# Patient Record
(demographics unavailable — no encounter records)

---

## 2024-12-06 NOTE — ADDENDUM
[FreeTextEntry1] :  Documented by Kenney Mahan acting as scribe for Dr. Marks on 12/06/2024. All Medical record entries made by the Scribe were at my, Dr. Marks, direction and personally dictated by me on 12/06/2024 . I have reviewed the chart and agree that the record accurately reflects my personal performance of the history, physical exam, assessment and plan. I have also personally directed, reviewed, and agreed with the discharge instructions.

## 2024-12-06 NOTE — HISTORY OF PRESENT ILLNESS
[de-identified] : Patient with h/o chronic rhinitis, pacemaker, right epistaxis, intranasal synechiae, chronic bilateral OE, and s/p right nasal cautery on 4/28/2020, left nasal cautery 8/22/22 presents today for a follow up. He states that he had a mild left-sided nosebleed on Sunday. He states that he is taking blood thinners. He states that he uses saline gel spray.

## 2024-12-06 NOTE — ASSESSMENT
[FreeTextEntry1] :  Reviewed and reconciled medications, allergies, PMHx, PSHx, SocHx, FMHx.  Patient with h/o chronic rhinitis, pacemaker, right epistaxis, intranasal synechiae, chronic bilateral OE, and s/p right nasal cautery on 4/28/2020, left nasal cautery 8/22/22 presents today for a follow up. He states that he had a mild left-sided nosebleed on Sunday. He states that he is taking blood thinners. He states that he uses saline gel spray.   Physical exam: -right ear canal: cerumen removed via curettage -left ear canal: cerumen removed via curettage -no lateralization to tuning forks -bilateral crusting, dryness, mucus, and vascularity intranasally -bifid uvula  ENT Procedure: Left Nasal Cauterization with Silver Nitrate  Plan: -Continue saline gel spray 3-4x per day starting tonight -Don't blow the nose or smear, only dab and throw tissue away. Sneeze with the mouth open -FU in 6 months

## 2024-12-06 NOTE — PHYSICAL EXAM
[Hearing Soliz Test (Tuning Fork On Forehead)] : no lateralization of tone [Normal] : mucosa is normal [Midline] : trachea located in midline position [Hearing Loss Right Only] : normal [Hearing Loss Left Only] : normal [FreeTextEntry8] :  cerumen removed via curettage [FreeTextEntry9] :  cerumen removed via curettage [de-identified] : bilateral crusting, dryness, mucus, and vascularity intranasally [de-identified] : bifid uvula

## 2024-12-06 NOTE — PROCEDURE
[FreeTextEntry1] : Left Nasal Cauterization with Silver Nitrate [FreeTextEntry2] : epistaxis [FreeTextEntry3] :  Procedure: Left Nasal Cauterization with Silver Nitrate. After topical 4% xylocaine and oxymetazoline, the nasal vault was re-evaluated. Bleeding site identified. Suction used to remove blood from the left nasal cavity. Cauterized with silver nitrate. Post-procedure instructions given. Patient tolerated procedure well

## 2024-12-06 NOTE — CONSULT LETTER
[Dear  ___] : Dear  [unfilled], [Courtesy Letter:] : I had the pleasure of seeing your patient, [unfilled], in my office today. [Please see my note below.] : Please see my note below. [Consult Closing:] : Thank you very much for allowing me to participate in the care of this patient.  If you have any questions, please do not hesitate to contact me. [Sincerely,] : Sincerely, [FreeTextEntry3] :  Ajit Marks MD FACS

## 2025-06-02 NOTE — ASSESSMENT
[FreeTextEntry1] :  Reviewed and reconciled medications, allergies, PMHx, PSHx, SocHx, FMHx.  Patient with h/o chronic rhinitis, pacemaker, epistaxis, intranasal synechiae, chronic bilateral OE, and s/p right nasal cautery on 4/28/2020, left nasal cautery 8/22/22 and 12/6/24 presents today for a follow up. He denies having reflux symptoms or voice hoarseness.  Physical exam: -right ear canal: cerumen removed via curettage -left ear canal: cerumen removed via curettage -no lateralization to tuning forks -mildly inflamed turbinates -class 1 tonsils -type 3 oral cavity -synechiae between the right septum and turbinate  Plan: -Continue Reflux diet- no eating 2 hours before bed -FU in 6 months

## 2025-06-02 NOTE — ADDENDUM
[FreeTextEntry1] :  Documented by Kenney Mahan acting as scribe for Dr. Marks on 06/02/2025. All Medical record entries made by the Scribe were at my, Dr. Marks, direction and personally dictated by me on 06/02/2025 . I have reviewed the chart and agree that the record accurately reflects my personal performance of the history, physical exam, assessment and plan. I have also personally directed, reviewed, and agreed with the discharge instructions.

## 2025-06-02 NOTE — PHYSICAL EXAM
[Hearing Soliz Test (Tuning Fork On Forehead)] : no lateralization of tone [Midline] : trachea located in midline position [Normal] : no rashes [Hearing Loss Right Only] : normal [Hearing Loss Left Only] : normal [FreeTextEntry8] : cerumen removed via curettage [FreeTextEntry9] : cerumen removed via curettage [de-identified] : mildly inflamed turbinates [de-identified] : synechiae between the right septum and turbinate [de-identified] : class 1 [de-identified] : type 3 oral cavity

## 2025-06-02 NOTE — HISTORY OF PRESENT ILLNESS
[de-identified] : Patient with h/o chronic rhinitis, pacemaker, epistaxis, intranasal synechiae, chronic bilateral OE, and s/p right nasal cautery on 4/28/2020, left nasal cautery 8/22/22 and 12/6/24 presents today for a follow up. He denies having reflux symptoms or voice hoarseness.